# Patient Record
Sex: FEMALE | Race: WHITE | Employment: FULL TIME | URBAN - METROPOLITAN AREA
[De-identification: names, ages, dates, MRNs, and addresses within clinical notes are randomized per-mention and may not be internally consistent; named-entity substitution may affect disease eponyms.]

---

## 2017-03-28 ENCOUNTER — TELEPHONE (OUTPATIENT)
Dept: FAMILY MEDICINE CLINIC | Facility: CLINIC | Age: 52
End: 2017-03-28

## 2017-03-28 NOTE — TELEPHONE ENCOUNTER
I received a [facility] mammogram result, showing a probable breast cyst requiring additional testing.  Pt is not in town,so I spoke to  in person who was in office today accompanying daughter Georgina Leo who saw me for an acute visit, and he said she does hav

## 2017-05-30 ENCOUNTER — TELEPHONE (OUTPATIENT)
Dept: FAMILY MEDICINE CLINIC | Facility: CLINIC | Age: 52
End: 2017-05-30

## 2017-05-30 DIAGNOSIS — H93.19 TINNITUS, UNSPECIFIED LATERALITY: Primary | ICD-10-CM

## 2017-05-30 NOTE — TELEPHONE ENCOUNTER
Patient called in stating that she will be visiting [facility] for a ENT/Audiologist specialist. States she will be seeing specialist there for tinnitus - stated that patient needed her PCP's orders? Patient has [employer]. Please advise.

## 2017-05-30 NOTE — TELEPHONE ENCOUNTER
Called and spoke with Pt, she would like order faxed to the [phone] fax number, Karen Andrews. Order faxed.

## 2018-01-31 ENCOUNTER — TELEPHONE (OUTPATIENT)
Dept: FAMILY MEDICINE CLINIC | Facility: CLINIC | Age: 53
End: 2018-01-31

## 2018-01-31 DIAGNOSIS — Z00.00 ENCOUNTER FOR SCREENING AND PREVENTATIVE CARE: Primary | ICD-10-CM

## 2018-01-31 NOTE — TELEPHONE ENCOUNTER
Pt called to requesting a px appt with Dr. Natasha Bethea, booked for 3/20. Pt is requesting her blood work be added to her chart prior to her visit so she can discuss results at time of appt. Please call back with confirmation once added.

## 2018-03-14 ENCOUNTER — LAB ENCOUNTER (OUTPATIENT)
Dept: LAB | Age: 53
End: 2018-03-14
Attending: FAMILY MEDICINE
Payer: COMMERCIAL

## 2018-03-14 DIAGNOSIS — Z00.00 ENCOUNTER FOR SCREENING AND PREVENTATIVE CARE: ICD-10-CM

## 2018-03-14 LAB
ALBUMIN SERPL BCP-MCNC: 4.6 G/DL (ref 3.5–4.8)
ALBUMIN/GLOB SERPL: 1.8 {RATIO} (ref 1–2)
ALP SERPL-CCNC: 51 U/L (ref 32–100)
ALT SERPL-CCNC: 10 U/L (ref 14–54)
ANION GAP SERPL CALC-SCNC: 8 MMOL/L (ref 0–18)
AST SERPL-CCNC: 18 U/L (ref 15–41)
BACTERIA UR QL AUTO: NEGATIVE /HPF
BASOPHILS # BLD: 0.1 K/UL (ref 0–0.2)
BASOPHILS NFR BLD: 1 %
BILIRUB SERPL-MCNC: 0.7 MG/DL (ref 0.3–1.2)
BILIRUB UR QL: NEGATIVE
BUN SERPL-MCNC: 10 MG/DL (ref 8–20)
BUN/CREAT SERPL: 11.2 (ref 10–20)
CALCIUM SERPL-MCNC: 9.1 MG/DL (ref 8.5–10.5)
CHLORIDE SERPL-SCNC: 104 MMOL/L (ref 95–110)
CHOLEST SERPL-MCNC: 203 MG/DL (ref 110–200)
CLARITY UR: CLEAR
CO2 SERPL-SCNC: 28 MMOL/L (ref 22–32)
COLOR UR: YELLOW
CREAT SERPL-MCNC: 0.89 MG/DL (ref 0.5–1.5)
EOSINOPHIL # BLD: 0.1 K/UL (ref 0–0.7)
EOSINOPHIL NFR BLD: 1 %
ERYTHROCYTE [DISTWIDTH] IN BLOOD BY AUTOMATED COUNT: 12.9 % (ref 11–15)
GLOBULIN PLAS-MCNC: 2.5 G/DL (ref 2.5–3.7)
GLUCOSE SERPL-MCNC: 98 MG/DL (ref 70–99)
GLUCOSE UR-MCNC: NEGATIVE MG/DL
HCT VFR BLD AUTO: 41.8 % (ref 35–48)
HDLC SERPL-MCNC: 76 MG/DL
HGB BLD-MCNC: 14 G/DL (ref 12–16)
HGB UR QL STRIP.AUTO: NEGATIVE
KETONES UR-MCNC: NEGATIVE MG/DL
LDLC SERPL CALC-MCNC: 118 MG/DL (ref 0–99)
LYMPHOCYTES # BLD: 1.5 K/UL (ref 1–4)
LYMPHOCYTES NFR BLD: 29 %
MCH RBC QN AUTO: 30 PG (ref 27–32)
MCHC RBC AUTO-ENTMCNC: 33.6 G/DL (ref 32–37)
MCV RBC AUTO: 89.4 FL (ref 80–100)
MONOCYTES # BLD: 0.4 K/UL (ref 0–1)
MONOCYTES NFR BLD: 8 %
NEUTROPHILS # BLD AUTO: 3.2 K/UL (ref 1.8–7.7)
NEUTROPHILS NFR BLD: 60 %
NITRITE UR QL STRIP.AUTO: NEGATIVE
NONHDLC SERPL-MCNC: 127 MG/DL
OSMOLALITY UR CALC.SUM OF ELEC: 289 MOSM/KG (ref 275–295)
PATIENT FASTING: YES
PH UR: 7 [PH] (ref 5–8)
PLATELET # BLD AUTO: 251 K/UL (ref 140–400)
PMV BLD AUTO: 8.5 FL (ref 7.4–10.3)
POTASSIUM SERPL-SCNC: 4.3 MMOL/L (ref 3.3–5.1)
PROT SERPL-MCNC: 7.1 G/DL (ref 5.9–8.4)
PROT UR-MCNC: NEGATIVE MG/DL
RBC # BLD AUTO: 4.67 M/UL (ref 3.7–5.4)
RBC #/AREA URNS AUTO: <1 /HPF
SODIUM SERPL-SCNC: 140 MMOL/L (ref 136–144)
SP GR UR STRIP: 1.01 (ref 1–1.03)
TRIGL SERPL-MCNC: 44 MG/DL (ref 1–149)
TSH SERPL-ACNC: 2.86 UIU/ML (ref 0.45–5.33)
UROBILINOGEN UR STRIP-ACNC: <2
VIT C UR-MCNC: NEGATIVE MG/DL
WBC # BLD AUTO: 5.2 K/UL (ref 4–11)
WBC #/AREA URNS AUTO: 2 /HPF

## 2018-03-20 ENCOUNTER — OFFICE VISIT (OUTPATIENT)
Dept: FAMILY MEDICINE CLINIC | Facility: CLINIC | Age: 53
End: 2018-03-20

## 2018-03-20 VITALS
SYSTOLIC BLOOD PRESSURE: 99 MMHG | WEIGHT: 119 LBS | TEMPERATURE: 97 F | RESPIRATION RATE: 16 BRPM | BODY MASS INDEX: 21.09 KG/M2 | HEIGHT: 63 IN | HEART RATE: 66 BPM | DIASTOLIC BLOOD PRESSURE: 65 MMHG

## 2018-03-20 DIAGNOSIS — Z29.8 NEED FOR MALARIA PROPHYLAXIS: Primary | ICD-10-CM

## 2018-03-20 DIAGNOSIS — Z00.00 ROUTINE PHYSICAL EXAMINATION: ICD-10-CM

## 2018-03-20 DIAGNOSIS — Z23 NEED FOR TDAP VACCINATION: ICD-10-CM

## 2018-03-20 DIAGNOSIS — Z23 NEED FOR VACCINATION AGAINST HEPATITIS A: ICD-10-CM

## 2018-03-20 PROCEDURE — 90715 TDAP VACCINE 7 YRS/> IM: CPT | Performed by: FAMILY MEDICINE

## 2018-03-20 PROCEDURE — 90471 IMMUNIZATION ADMIN: CPT | Performed by: FAMILY MEDICINE

## 2018-03-20 PROCEDURE — 90472 IMMUNIZATION ADMIN EACH ADD: CPT | Performed by: FAMILY MEDICINE

## 2018-03-20 PROCEDURE — 90632 HEPA VACCINE ADULT IM: CPT | Performed by: FAMILY MEDICINE

## 2018-03-20 PROCEDURE — 99396 PREV VISIT EST AGE 40-64: CPT | Performed by: FAMILY MEDICINE

## 2018-03-20 RX ORDER — MEFLOQUINE HYDROCHLORIDE 250 MG/1
250 TABLET ORAL
Qty: 6 TABLET | Refills: 0 | Status: SHIPPED | OUTPATIENT
Start: 2018-03-20

## 2018-03-20 NOTE — PROGRESS NOTES
HPI:    Patient ID: Teresa Kaufman is a 45year old female.     45year old  female here for complete preventive care physical and for status update on any confirmed chronic medical illnesses and follow up on any previous labs or procedures t Soft. Bowel sounds are normal. She exhibits no distension. There is no tenderness. Musculoskeletal: Normal range of motion. Neurological: She is alert and oriented to person, place, and time. ASSESSMENT/PLAN:   1.  Routine physical examinat

## 2018-03-20 NOTE — PATIENT INSTRUCTIONS
All adult screening ordered and done appropriate for patient's age and gender and risk factors and complaints. Medication reviewed and renewed where needed and appropriate. Comply with medications. Monitor blood pressures and record at home.  Limit salt

## 2018-10-03 ENCOUNTER — TELEPHONE (OUTPATIENT)
Dept: FAMILY MEDICINE CLINIC | Facility: CLINIC | Age: 53
End: 2018-10-03

## 2018-10-03 DIAGNOSIS — Z23 NEED FOR HEPATITIS A VACCINATION: Primary | ICD-10-CM

## 2018-10-03 NOTE — TELEPHONE ENCOUNTER
Pt requesting orders for Hep A vaccine booster be added to the chart. Pt states that she does not want to miss her window. Please call back with confirmation once added.

## 2018-10-04 ENCOUNTER — NURSE ONLY (OUTPATIENT)
Dept: FAMILY MEDICINE CLINIC | Facility: CLINIC | Age: 53
End: 2018-10-04
Payer: COMMERCIAL

## 2018-10-04 DIAGNOSIS — Z23 NEED FOR HEPATITIS A VACCINATION: Primary | ICD-10-CM

## 2018-10-04 PROCEDURE — 90471 IMMUNIZATION ADMIN: CPT | Performed by: FAMILY MEDICINE

## 2018-10-04 PROCEDURE — 90632 HEPA VACCINE ADULT IM: CPT | Performed by: FAMILY MEDICINE
